# Patient Record
Sex: FEMALE | Race: BLACK OR AFRICAN AMERICAN | Employment: UNEMPLOYED | URBAN - METROPOLITAN AREA
[De-identification: names, ages, dates, MRNs, and addresses within clinical notes are randomized per-mention and may not be internally consistent; named-entity substitution may affect disease eponyms.]

---

## 2021-05-12 ENCOUNTER — OFFICE VISIT (OUTPATIENT)
Dept: ONCOLOGY | Age: 61
End: 2021-05-12
Payer: COMMERCIAL

## 2021-05-12 VITALS
HEIGHT: 66 IN | WEIGHT: 277 LBS | DIASTOLIC BLOOD PRESSURE: 92 MMHG | BODY MASS INDEX: 44.52 KG/M2 | HEART RATE: 76 BPM | RESPIRATION RATE: 16 BRPM | SYSTOLIC BLOOD PRESSURE: 186 MMHG | TEMPERATURE: 98.3 F | OXYGEN SATURATION: 96 %

## 2021-05-12 DIAGNOSIS — D75.1 ERYTHROCYTOSIS: Primary | ICD-10-CM

## 2021-05-12 PROCEDURE — 99204 OFFICE O/P NEW MOD 45 MIN: CPT | Performed by: STUDENT IN AN ORGANIZED HEALTH CARE EDUCATION/TRAINING PROGRAM

## 2021-05-12 RX ORDER — HYDROCHLOROTHIAZIDE 12.5 MG/1
TABLET ORAL
COMMUNITY
Start: 2021-05-04

## 2021-05-12 RX ORDER — LANCETS 33 GAUGE
EACH MISCELLANEOUS
COMMUNITY
Start: 2021-03-01

## 2021-05-12 RX ORDER — CETIRIZINE HCL 10 MG
TABLET ORAL
COMMUNITY
Start: 2021-05-06

## 2021-05-12 RX ORDER — HUMAN INSULIN 100 [IU]/ML
INJECTION, SUSPENSION SUBCUTANEOUS
COMMUNITY
Start: 2021-05-06

## 2021-05-12 RX ORDER — ASPIRIN 81 MG/1
TABLET ORAL
COMMUNITY
Start: 2021-05-04

## 2021-05-12 RX ORDER — CELECOXIB 200 MG/1
CAPSULE ORAL
COMMUNITY
Start: 2021-05-06

## 2021-05-12 RX ORDER — GABAPENTIN 400 MG/1
400 CAPSULE ORAL 3 TIMES DAILY
COMMUNITY

## 2021-05-12 RX ORDER — ATORVASTATIN CALCIUM 20 MG/1
TABLET, FILM COATED ORAL
COMMUNITY
Start: 2021-05-04

## 2021-05-12 RX ORDER — EMPAGLIFLOZIN 25 MG/1
TABLET, FILM COATED ORAL
COMMUNITY
Start: 2021-05-04

## 2021-05-12 RX ORDER — AMLODIPINE BESYLATE 5 MG/1
TABLET ORAL
COMMUNITY
Start: 2021-05-04

## 2021-05-12 RX ORDER — METOPROLOL TARTRATE 100 MG/1
TABLET ORAL
COMMUNITY
Start: 2021-05-04

## 2021-05-12 RX ORDER — BLOOD SUGAR DIAGNOSTIC
STRIP MISCELLANEOUS
COMMUNITY
Start: 2021-04-07

## 2021-05-12 RX ORDER — LANCETS 30 GAUGE
EACH MISCELLANEOUS
COMMUNITY
Start: 2021-05-04

## 2021-05-12 RX ORDER — CALCIUM CARB/VITAMIN D3/VIT K1 500-100-40
TABLET,CHEWABLE ORAL
COMMUNITY
Start: 2021-05-04

## 2021-05-12 RX ORDER — METFORMIN HYDROCHLORIDE 1000 MG/1
TABLET ORAL
COMMUNITY
Start: 2021-05-04

## 2021-05-12 NOTE — PROGRESS NOTES
[facility] [place] at [address] [facility] [address] [place] [address] [address] [facility]  W: [phone] F: [phone]      Reason for Visit:   Ranulfo Werner is a 56 y.o. female who is seen in consultation at the request of Kirk Philippe NP for evaluation of erythrocytosis, RBC abnormality . Hematology / Oncology Treatment History:     Hematological/Oncological Diagnosis: RBC abnormality, Erythrocytosis    History of Present Illness:       Very pleasant 56-year-old history was significant for diabetes mellitus type 2, hypertension, anxiety disorder, asthma, osteoarthritis, presents for evaluation and treatment of neuropathy as well as elevated RBC count. Patient reports that she has no significant clinical symptoms lately considering patient has been seen. She denies any unintentional weight loss, severe fatigue, night sweats, or any bone pains. She does note bilateral lower extremity neuropathy that has been bothering her significantly and is in a stocking glove pattern on her lower extremities. On further discussion, the patient reports that about 40 years ago, she was told that she had a hemoglobin variant that affected her blood. She was told that this did not have any associated clinical findings and she is unclear what the initial diagnosis was. No other known family history of blood disease or blood disorder. On review of social history, the patient is a current smoker smoking about 1.25 to 1.5 packs/day. She does note that this is recently increased over the last year as a consequence of anxiety. She has had no recent history of any colonoscopies and is overdue for her routine mammography. Positive ROS findings include: Anxiety, lower extremity neuropathy pain, mild fatigue  Review of Systems: A complete review of systems was obtained, negative except as described above.     Past Medical History:   Diagnosis Date    Diabetes ([facility] [address]     Hypertension       Past Surgical History:   Procedure Laterality Date    HX GYN          HX HEENT  2009    craniotomy and another procedure    HX ORTHOPAEDIC Bilateral 2011    carpal tunnel release surgery      Social History     Tobacco Use    Smoking status: Current Every Day Smoker     Packs/day: 1.25     Years: 40.00     Pack years: 50.00     Types: Cigarettes    Smokeless tobacco: Never Used   Substance Use Topics    Alcohol use: Yes     Frequency: Monthly or less     Comment: VERY RARE/ HOLDAYS ONLY      No family history on file. Current Outpatient Medications   Medication Sig    amLODIPine (NORVASC) 5 mg tablet TAKE ONE TABLET BY MOUTH EVERY DAY    aspirin delayed-release 81 mg tablet TAKE ONE TABLET BY MOUTH EVERY DAY    atorvastatin (LIPITOR) 20 mg tablet TAKE ONE TABLET BY MOUTH EVERY DAY    OneTouch Verio test strips strip USE TO CHECK BLOOD GLUCOSE TWO TIMES A DAY AND AS NEEDED    celecoxib (CELEBREX) 200 mg capsule TAKE ONE CAPSULE BY MOUTH TWICE A DAY    cetirizine (ZYRTEC) 10 mg tablet TAKE ONE TABLET BY MOUTH EVERY DAY    Jardiance 25 mg tablet TAKE ONE TABLET BY MOUTH EVERY DAY IN THE MORNING    hydroCHLOROthiazide (HYDRODIURIL) 12.5 mg tablet TAKE ONE TABLET BY MOUTH EVERY DAY    OneTouch Delica Plus Lancet 30 gauge misc USE TO CHECK BLOOD GLUCOSE TWO TIMES A DAY AND AS NEEDED    OneTouch Delica Plus Lancet 33 gauge misc USE TO CHECK BLOOD GLUCOSE TWO TIMES A DAY AND AS NEEDED    NovoLIN 70/30 U-100 Insulin 100 unit/mL (70-30) injection INJECT 63 UNITS UNDER THE SKIN TWO TIMES A DAY    metFORMIN (GLUCOPHAGE) 1,000 mg tablet TAKE ONE TABLET BY MOUTH TWICE A DAY    metoprolol tartrate (LOPRESSOR) 100 mg IR tablet TAKE ONE TABLET BY MOUTH TWICE A DAY    Insulin Syringe-Needle U-100 1 mL 31 gauge x 5/16 syrg USE TWO TIMES A DAY AS DIRECTED    gabapentin (NEURONTIN) 400 mg capsule Take 400 mg by mouth three (3) times daily.      No current facility-administered medications for this visit. No Known Allergies         Physical Exam:     Visit Vitals  BP (!) 186/92 (BP 1 Location: Right arm, BP Patient Position: At rest)   Pulse 76   Temp 98.3 °F (36.8 °C) (Temporal)   Resp 16   Ht 5' 6\" (1.676 m)   Wt 277 lb (125.6 kg)   SpO2 96% Comment: RA   BMI 44.71 kg/m²     ECOG PS: 1  General: No distress  Eyes: PERRLA, anicteric sclerae, conjunctiva are injected  HENT: Atraumatic with normal appearance of ears and nose; OP clear  Neck: Supple; no visualized JVD  Lymphatic: No cervical, or supraclavicular lymphadenopathy. Respiratory: CTAB, normal respiratory effort  CV: Normal rate, regular rhythm, no murmurs, no peripheral edema  GI: Soft, nontender, nondistended, no palpable masses, no hepatomegaly, no splenomegaly. Area of skin thickening under right breast with some hardening of subcutaneous fat just under right breast.   MS: Normal gait and station. Digits without clubbing or cyanosis. Skin: No rashes, ecchymoses, or petechiae. Normal temperature, turgor, and texture. Neuro/Psych: Alert, oriented, appropriate affect, normal judgment/insight      Results:     Lab Results   Component Value Date/Time    WBC 19.7 (H) 04/17/2009 04:00 AM    HGB 14.2 04/17/2009 04:00 AM    HCT 38.9 04/17/2009 04:00 AM    PLATELET 715 65/52/7658 04:00 AM    MCV 84.4 04/17/2009 04:00 AM    ABS.  NEUTROPHILS 21.4 (H) 04/16/2009 03:00 AM     Lab Results   Component Value Date/Time    Sodium 145 04/17/2009 04:00 AM    Potassium 3.3 (L) 04/17/2009 04:00 AM    Chloride 111 (H) 04/17/2009 04:00 AM    CO2 21 04/17/2009 04:00 AM    Glucose 302 (H) 04/17/2009 04:00 AM    BUN 27 (H) 04/17/2009 04:00 AM    Creatinine 2.1 (H) 04/17/2009 04:00 AM    GFR est AA 32 (L) 04/17/2009 04:00 AM    GFR est non-AA 26 (L) 04/17/2009 04:00 AM    Calcium 9.8 04/17/2009 04:00 AM    Glucose (POC) 341 (H) 04/17/2009 02:28 PM     Lab Results   Component Value Date/Time    Bilirubin, total 2.5 (H) 04/16/2009 03:00 AM    ALT (SGPT) 57 04/16/2009 03:00 AM    Alk. phosphatase 192 (H) 04/16/2009 03:00 AM    Protein, total 8.3 (H) 04/16/2009 03:00 AM    Albumin 3.2 (L) 04/16/2009 03:00 AM    Globulin 5.1 (H) 04/16/2009 03:00 AM         Assessment and Recommendations:     1. 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 The differential diagnosis is quite broad. Relative erythrocytosis may be seen with loss of fluid from the vascular spaces, as may be seen with emesis, diarrhea, diuretics, sweating, polyuria, hypodipsia, hypoalbuminemia, capillary leak syndrome, and burns. Relative erythrocytosis may also be seen with chronic plasma volume contraction with may be seen with hypoxia, androgen therapy, recombinant erythropoietin therapy, hypertension, tobacco use, pheochromocytoma, ethanol abuse and sleep apnea. Absolute erythrocytosis may be seen with hypoxia as well, such as many be seen with carbon monoxide intoxication (both from cigarettes or environmental), high affinity hemoglobins, high altitude, pulmonary disease, right to left shunts, sleep apnea and neurologic diseases. Absolute erythrocytosis is also seen with renal diseases, such as renal artery stenosis, focal sclerosing or membranous glomerulonephritis, and renal transplants. Tumors that are associated with true erythrocytosis include hypernephroma, hepatoma, cerebellar hemangioblasoma, uterine fibromyoma, adrenal tumors, meningioma and pheochromocytoma. Drugs associated with erythrocytosis include androgenic steroids and recombinant erythropoietin. Familial erythrocytosis is seen with [place] polycythemia (autosomal recessive associated with germline mutation of VHL), epo receptor mutations and 2,3 DPG deficiency. Erythrocytosis is also seen with polycythemia vera but is rarely isolated. If possible, the recommendation is to get an rbc mass and plasma volume determination.  If one has a normal rbc mass and decreased plasma volume, the likely differential narrows down to tobacco use, androgens, diuretics, hypertension and pheochromocytoma. If the patient has an elevated rbc mass, one should next check the O2 saturation. If it is less than 93%, consideration should be given to hypoxic erythrocytosis as described above. If the O2 saturation is 93% or higher, one should check for the Don 2 V617F mutation, which is positive, generally signifies polycythemia vera. If the Jak2 mutation is negative, one should next check a serum erythropoietin level. If the erythropoietin level is high, the differential diagnosis narrows down to renal disease, tumors (as described above), [place] polycythemia, and high affinity hemoglobins. If the erythropoietin level is normal or low, one considers polycythemia vera, sleep apnea, epo-receptor mutation, renal disease, tumors, and high affinity hemoglobin. I have reviewed the basic rationale behind the workup with the patient who is agreeable to proceeding with initial testing. 2. Neuropathy  -Hematologic contributions neuropathy are broad though they do include plasma cell dyscrasias as well as contributions from autoimmune disease. We will plan to rule out plasma cell dyscrasia with SPEP/gammopathy profile.     Follow-up visit in 2 weeks to review labs    Signed By: Basilia James MD      Attending [address] [place] [address]

## 2021-05-27 ENCOUNTER — OFFICE VISIT (OUTPATIENT)
Dept: ONCOLOGY | Age: 61
End: 2021-05-27
Payer: COMMERCIAL

## 2021-05-27 VITALS
HEART RATE: 68 BPM | OXYGEN SATURATION: 95 % | TEMPERATURE: 98.6 F | DIASTOLIC BLOOD PRESSURE: 69 MMHG | RESPIRATION RATE: 18 BRPM | HEIGHT: 66 IN | SYSTOLIC BLOOD PRESSURE: 119 MMHG | BODY MASS INDEX: 43.71 KG/M2 | WEIGHT: 272 LBS

## 2021-05-27 DIAGNOSIS — D75.1 ERYTHROPOIETIN POLYCYTHEMIA: ICD-10-CM

## 2021-05-27 DIAGNOSIS — D75.839 THROMBOCYTOSIS: Primary | ICD-10-CM

## 2021-05-27 DIAGNOSIS — D75.1 ERYTHROCYTOSIS: ICD-10-CM

## 2021-05-27 PROCEDURE — 99213 OFFICE O/P EST LOW 20 MIN: CPT | Performed by: STUDENT IN AN ORGANIZED HEALTH CARE EDUCATION/TRAINING PROGRAM

## 2021-05-27 RX ORDER — ALBUTEROL SULFATE 90 UG/1
AEROSOL, METERED RESPIRATORY (INHALATION)
COMMUNITY
Start: 2021-05-04

## 2021-05-27 NOTE — PROGRESS NOTES
Kvng Meneses is a 56 y.o. female  HIPAA verified by two patient identifiers. Chief Complaint   Patient presents with    Follow-up     Erythrocytosis, here for lab results     Visit Vitals  /69   Pulse 68   Temp 98.6 °F (37 °C) (Oral)   Resp 18   Ht 5' 6\" (1.676 m)   Wt 272 lb (123.4 kg)   SpO2 95%   BMI 43.90 kg/m²       Pain Scale: 9/10  Pain Location: Generalized  1. Have you been to the ER, urgent care clinic since your last visit? Hospitalized since your last visit? No    2. Have you seen or consulted any other health care providers outside of the [address] [place] [address] since your last visit? Include any pap smears or colon screening.  No

## 2021-05-28 NOTE — PROGRESS NOTES
[facility] [place] at [address] [facility] [address]  W: [phone] F: [phone]      Reason for Visit:   Oliva Londono is a 56 y.o. female who is seen in consultation at the request of Lucila Fan NP for evaluation of erythrocytosis, RBC abnormality . Hematology / Oncology Treatment History:     Hematological/Oncological Diagnosis: RBC abnormality, Erythrocytosis    History of Present Illness:       Very pleasant 56-year-old history was significant for diabetes mellitus type 2, hypertension, anxiety disorder, asthma, osteoarthritis, presents for evaluation and treatment of neuropathy as well as elevated RBC count. Patient reports that she has no significant clinical symptoms lately considering patient has been seen. She denies any unintentional weight loss, severe fatigue, night sweats, or any bone pains. She does note bilateral lower extremity neuropathy that has been bothering her significantly and is in a stocking glove pattern on her lower extremities. On further discussion, the patient reports that about 40 years ago, she was told that she had a hemoglobin variant that affected her blood. She was told that this did not have any associated clinical findings and she is unclear what the initial diagnosis was. No other known family history of blood disease or blood disorder. On review of social history, the patient is a current smoker smoking about 1.25 to 1.5 packs/day. She does note that this is recently increased over the last year as a consequence of anxiety. She has had no recent history of any colonoscopies and is overdue for her routine mammography. Interval History:     Since last evaluation, the patient has been without new severe complaints. No reported vasomotor symptoms. She continues to have mild fatigue, anxiety and lower extremity pain    Positive ROS findings include:  Anxiety, lower extremity neuropathy pain, mild fatigue  Review of Systems: A complete review of systems was obtained, negative except as described above. Past Medical History:   Diagnosis Date    Diabetes ([address]     Hypertension       Past Surgical History:   Procedure Laterality Date    HX GYN          HX HEENT  2009    craniotomy and another procedure    HX ORTHOPAEDIC Bilateral 2011    carpal tunnel release surgery      Social History     Tobacco Use    Smoking status: Current Every Day Smoker     Packs/day: 1.25     Years: 40.00     Pack years: 50.00     Types: Cigarettes    Smokeless tobacco: Never Used   Substance Use Topics    Alcohol use: Yes     Comment: VERY RARE/ HOLDAYS ONLY      History reviewed. No pertinent family history.   Current Outpatient Medications   Medication Sig    amLODIPine (NORVASC) 5 mg tablet TAKE ONE TABLET BY MOUTH EVERY DAY    aspirin delayed-release 81 mg tablet TAKE ONE TABLET BY MOUTH EVERY DAY    atorvastatin (LIPITOR) 20 mg tablet TAKE ONE TABLET BY MOUTH EVERY DAY    OneTouch Verio test strips strip USE TO CHECK BLOOD GLUCOSE TWO TIMES A DAY AND AS NEEDED    celecoxib (CELEBREX) 200 mg capsule TAKE ONE CAPSULE BY MOUTH TWICE A DAY    cetirizine (ZYRTEC) 10 mg tablet TAKE ONE TABLET BY MOUTH EVERY DAY    Jardiance 25 mg tablet TAKE ONE TABLET BY MOUTH EVERY DAY IN THE MORNING    hydroCHLOROthiazide (HYDRODIURIL) 12.5 mg tablet TAKE ONE TABLET BY MOUTH EVERY DAY    OneTouch Delica Plus Lancet 30 gauge misc USE TO CHECK BLOOD GLUCOSE TWO TIMES A DAY AND AS NEEDED    OneTouch Delica Plus Lancet 33 gauge misc USE TO CHECK BLOOD GLUCOSE TWO TIMES A DAY AND AS NEEDED    NovoLIN 70/30 U-100 Insulin 100 unit/mL (70-30) injection INJECT 63 UNITS UNDER THE SKIN TWO TIMES A DAY    metFORMIN (GLUCOPHAGE) 1,000 mg tablet TAKE ONE TABLET BY MOUTH TWICE A DAY    metoprolol tartrate (LOPRESSOR) 100 mg IR tablet TAKE ONE TABLET BY MOUTH TWICE A DAY    Insulin Syringe-Needle U-100 1 mL 31 gauge x 5/16 syrg USE TWO TIMES A DAY AS DIRECTED    gabapentin (NEURONTIN) 400 mg capsule Take 400 mg by mouth three (3) times daily.  albuterol (PROVENTIL HFA, VENTOLIN HFA, PROAIR HFA) 90 mcg/actuation inhaler INHALE TWO PUFFS BY MOUTH EVERY 4 TO 6 HOURS AS NEEDED     No current facility-administered medications for this visit. No Known Allergies         Physical Exam:     Visit Vitals  /69   Pulse 68   Temp 98.6 °F (37 °C) (Oral)   Resp 18   Ht 5' 6\" (1.676 m)   Wt 272 lb (123.4 kg)   SpO2 95%   BMI 43.90 kg/m²     ECOG PS: 1  General: No distress  Eyes: PERRLA, anicteric sclerae, conjunctiva are no longer injected  HENT: Atraumatic with normal appearance of ears and nose; OP clear  Neck: Supple; no visualized JVD  Lymphatic: No cervical, or supraclavicular lymphadenopathy. Respiratory: CTAB, normal respiratory effort  CV: Normal rate, regular rhythm, no murmurs, no peripheral edema  GI: Soft, nontender, nondistended, no palpable masses, no hepatomegaly, no splenomegaly. MS: Normal gait and station. Digits without clubbing or cyanosis. Skin: No rashes, ecchymoses, or petechiae. Normal temperature, turgor, and texture. Neuro/Psych: Alert, oriented, appropriate affect, normal judgment/insight      Results:     Lab Results   Component Value Date/Time    WBC 9.0 05/12/2021 11:35 AM    HGB 16.4 (H) 05/12/2021 11:35 AM    HCT 47.1 (H) 05/12/2021 11:35 AM    PLATELET 593 45/02/2251 11:35 AM    MCV 89.7 05/12/2021 11:35 AM    ABS.  NEUTROPHILS 4.3 05/12/2021 11:35 AM     Lab Results   Component Value Date/Time    Sodium 139 05/12/2021 11:35 AM    Potassium 4.0 05/12/2021 11:35 AM    Chloride 107 05/12/2021 11:35 AM    CO2 25 05/12/2021 11:35 AM    Glucose 177 (H) 05/12/2021 11:35 AM    BUN 21 (H) 05/12/2021 11:35 AM    Creatinine 1.06 (H) 05/12/2021 11:35 AM    GFR est AA >60 05/12/2021 11:35 AM    GFR est non-AA 53 (L) 05/12/2021 11:35 AM    Calcium 9.4 05/12/2021 11:35 AM    Glucose (POC) 341 (H) 04/17/2009 02:28 PM     Lab Results   Component Value Date/Time    Bilirubin, total 1.3 (H) 05/12/2021 11:35 AM    ALT (SGPT) 113 (H) 05/12/2021 11:35 AM    Alk. phosphatase 107 05/12/2021 11:35 AM    Protein, total 7.0 05/12/2021 11:35 AM    Protein, total 6.8 05/12/2021 11:35 AM    Albumin 3.8 05/12/2021 11:35 AM    Globulin 3.2 05/12/2021 11:35 AM         Assessment and Recommendations:     1. Hemoglobin C trait  - Hemoglobin C trait is a hemoglobinopathy that is clinically silent. Patient educated bout HbC trait. No expected significant clinical pathology from this hemoglobin variant. 2. Innapropraitely elevated EPO level, elevated LDH  - Given the patient's erythrocytosis, would expect EPO level to be suppressed. It is not and is above the upper threshold of normal.    - Will check CT abd/pelvis with contrast to rule out renal mass, liver mass. 3. Neuropathy  -SPEP, DEIDRA negative. B12, folate normal.      Follow-up visit in 2 weeks to review imaging to rule out renal mass/liver lesion.     Signed By: Benny Tubbs MD      Attending Medical Oncologist   [facility]

## 2021-06-11 ENCOUNTER — HOSPITAL ENCOUNTER (OUTPATIENT)
Dept: CT IMAGING | Age: 61
Discharge: HOME | End: 2021-06-11
Attending: STUDENT IN AN ORGANIZED HEALTH CARE EDUCATION/TRAINING PROGRAM
Payer: COMMERCIAL

## 2021-06-11 DIAGNOSIS — D75.839 THROMBOCYTOSIS: ICD-10-CM

## 2021-06-11 DIAGNOSIS — D75.1 ERYTHROPOIETIN POLYCYTHEMIA: ICD-10-CM

## 2021-06-11 PROCEDURE — 74177 CT ABD & PELVIS W/CONTRAST: CPT

## 2021-06-11 PROCEDURE — 74011000636 HC RX REV CODE- 636: Performed by: STUDENT IN AN ORGANIZED HEALTH CARE EDUCATION/TRAINING PROGRAM

## 2021-06-11 PROCEDURE — 74011000250 HC RX REV CODE- 250: Performed by: STUDENT IN AN ORGANIZED HEALTH CARE EDUCATION/TRAINING PROGRAM

## 2021-06-11 RX ORDER — BARIUM SULFATE 20 MG/ML
900 SUSPENSION ORAL
Status: COMPLETED | OUTPATIENT
Start: 2021-06-11 | End: 2021-06-11

## 2021-06-11 RX ADMIN — IOPAMIDOL 100 ML: 755 INJECTION, SOLUTION INTRAVENOUS at 13:23

## 2021-06-11 RX ADMIN — BARIUM SULFATE 900 ML: 20 SUSPENSION ORAL at 13:24

## 2021-06-17 ENCOUNTER — OFFICE VISIT (OUTPATIENT)
Dept: ONCOLOGY | Age: 61
End: 2021-06-17
Payer: COMMERCIAL

## 2021-06-17 VITALS
SYSTOLIC BLOOD PRESSURE: 162 MMHG | BODY MASS INDEX: 45.48 KG/M2 | TEMPERATURE: 98.5 F | DIASTOLIC BLOOD PRESSURE: 85 MMHG | RESPIRATION RATE: 20 BRPM | WEIGHT: 283 LBS | HEART RATE: 73 BPM | OXYGEN SATURATION: 95 % | HEIGHT: 66 IN

## 2021-06-17 DIAGNOSIS — D75.1 ERYTHROCYTOSIS: Primary | ICD-10-CM

## 2021-06-17 DIAGNOSIS — D75.1 ERYTHROPOIETIN POLYCYTHEMIA: ICD-10-CM

## 2021-06-17 PROCEDURE — 99214 OFFICE O/P EST MOD 30 MIN: CPT | Performed by: STUDENT IN AN ORGANIZED HEALTH CARE EDUCATION/TRAINING PROGRAM

## 2021-06-17 NOTE — PROGRESS NOTES
[facility] [place] at [address] [facility] [address]  W: [phone] F: [phone]      Reason for Visit:   Lyn Kussmaul is a 56 y.o. female who is seen in consultation at the request of Endy Arreguin NP for evaluation of erythrocytosis, RBC abnormality . Hematology / Oncology Treatment History:     Hematological/Oncological Diagnosis: RBC abnormality, Erythrocytosis    History of Present Illness:       Very pleasant 78-year-old history was significant for diabetes mellitus type 2, hypertension, anxiety disorder, asthma, osteoarthritis, presents for evaluation and treatment of neuropathy as well as elevated RBC count. Patient reports that she has no significant clinical symptoms lately considering patient has been seen. She denies any unintentional weight loss, severe fatigue, night sweats, or any bone pains. She does note bilateral lower extremity neuropathy that has been bothering her significantly and is in a stocking glove pattern on her lower extremities. On further discussion, the patient reports that about 40 years ago, she was told that she had a hemoglobin variant that affected her blood. She was told that this did not have any associated clinical findings and she is unclear what the initial diagnosis was. No other known family history of blood disease or blood disorder. On review of social history, the patient is a current smoker smoking about 1.25 to 1.5 packs/day. She does note that this is recently increased over the last year as a consequence of anxiety. She has had no recent history of any colonoscopies and is overdue for her routine mammography. Interval History:     No new clinical complaints since last visit, continues to have lower extremity neuropathy, fatigue. No clear vasomotor symptoms. Positive ROS findings include:  Anxiety, lower extremity neuropathy pain, mild fatigue  Review of Systems: A complete review of systems was obtained, negative except as described above. Past Medical History:   Diagnosis Date    Diabetes ([address]     Hypertension       Past Surgical History:   Procedure Laterality Date    HX GYN          HX HEENT  2009    craniotomy and another procedure    HX ORTHOPAEDIC Bilateral 2011    carpal tunnel release surgery      Social History     Tobacco Use    Smoking status: Current Every Day Smoker     Packs/day: 1.25     Years: 40.00     Pack years: 50.00     Types: Cigarettes    Smokeless tobacco: Never Used   Substance Use Topics    Alcohol use: Yes     Comment: VERY RARE/ HOLDAYS ONLY      History reviewed. No pertinent family history.   Current Outpatient Medications   Medication Sig    albuterol (PROVENTIL HFA, VENTOLIN HFA, PROAIR HFA) 90 mcg/actuation inhaler INHALE TWO PUFFS BY MOUTH EVERY 4 TO 6 HOURS AS NEEDED    amLODIPine (NORVASC) 5 mg tablet TAKE ONE TABLET BY MOUTH EVERY DAY    aspirin delayed-release 81 mg tablet TAKE ONE TABLET BY MOUTH EVERY DAY    atorvastatin (LIPITOR) 20 mg tablet TAKE ONE TABLET BY MOUTH EVERY DAY    OneTouch Verio test strips strip USE TO CHECK BLOOD GLUCOSE TWO TIMES A DAY AND AS NEEDED    celecoxib (CELEBREX) 200 mg capsule TAKE ONE CAPSULE BY MOUTH TWICE A DAY    cetirizine (ZYRTEC) 10 mg tablet TAKE ONE TABLET BY MOUTH EVERY DAY    Jardiance 25 mg tablet TAKE ONE TABLET BY MOUTH EVERY DAY IN THE MORNING    hydroCHLOROthiazide (HYDRODIURIL) 12.5 mg tablet TAKE ONE TABLET BY MOUTH EVERY DAY    OneTouch Delica Plus Lancet 30 gauge misc USE TO CHECK BLOOD GLUCOSE TWO TIMES A DAY AND AS NEEDED    OneTouch Delica Plus Lancet 33 gauge misc USE TO CHECK BLOOD GLUCOSE TWO TIMES A DAY AND AS NEEDED    NovoLIN 70/30 U-100 Insulin 100 unit/mL (70-30) injection INJECT 63 UNITS UNDER THE SKIN TWO TIMES A DAY    metFORMIN (GLUCOPHAGE) 1,000 mg tablet TAKE ONE TABLET BY MOUTH TWICE A DAY    metoprolol tartrate (LOPRESSOR) 100 mg IR tablet TAKE ONE TABLET BY MOUTH TWICE A DAY    Insulin Syringe-Needle U-100 1 mL 31 gauge x 5/16 syrg USE TWO TIMES A DAY AS DIRECTED    gabapentin (NEURONTIN) 400 mg capsule Take 400 mg by mouth three (3) times daily. No current facility-administered medications for this visit. No Known Allergies         Physical Exam:     Visit Vitals  BP (!) 162/85   Pulse 73   Temp 98.5 °F (36.9 °C) (Oral)   Resp 20   Ht 5' 6\" (1.676 m)   Wt 283 lb (128.4 kg)   SpO2 95%   BMI 45.68 kg/m²     ECOG PS: 1  General: No distress  Eyes: PERRLA, anicteric sclerae, conjunctiva are no longer injected  HENT: Atraumatic with normal appearance of ears and nose; OP clear  Neck: Supple; no visualized JVD  Lymphatic: No cervical, or supraclavicular lymphadenopathy. Respiratory: CTAB, normal respiratory effort  CV: Normal rate, regular rhythm, no murmurs, no peripheral edema  GI: Soft, nontender, nondistended, no palpable masses, no hepatomegaly, no splenomegaly. MS: Normal gait and station. Digits without clubbing or cyanosis. Skin: No rashes, ecchymoses, or petechiae. Normal temperature, turgor, and texture. Neuro/Psych: Alert, oriented, appropriate affect, normal judgment/insight      Results:     Lab Results   Component Value Date/Time    WBC 9.0 05/12/2021 11:35 AM    HGB 16.4 (H) 05/12/2021 11:35 AM    HCT 47.1 (H) 05/12/2021 11:35 AM    PLATELET 847 02/84/7313 11:35 AM    MCV 89.7 05/12/2021 11:35 AM    ABS.  NEUTROPHILS 4.3 05/12/2021 11:35 AM     Lab Results   Component Value Date/Time    Sodium 139 05/12/2021 11:35 AM    Potassium 4.0 05/12/2021 11:35 AM    Chloride 107 05/12/2021 11:35 AM    CO2 25 05/12/2021 11:35 AM    Glucose 177 (H) 05/12/2021 11:35 AM    BUN 21 (H) 05/12/2021 11:35 AM    Creatinine 1.06 (H) 05/12/2021 11:35 AM    GFR est AA >60 05/12/2021 11:35 AM    GFR est non-AA 53 (L) 05/12/2021 11:35 AM    Calcium 9.4 05/12/2021 11:35 AM    Glucose (POC) 341 (H) 04/17/2009 02:28 PM     Lab Results   Component Value Date/Time    Bilirubin, total 1.3 (H) 05/12/2021 11:35 AM    ALT (SGPT) 113 (H) 05/12/2021 11:35 AM    Alk. phosphatase 107 05/12/2021 11:35 AM    Protein, total 7.0 05/12/2021 11:35 AM    Protein, total 6.8 05/12/2021 11:35 AM    Albumin 3.8 05/12/2021 11:35 AM    Globulin 3.2 05/12/2021 11:35 AM         Assessment and Recommendations:     # Polycythemia with elevated EPO level   - will recheck EPO level today, CT abd/pelvis shows no evidence of tumor or epo secreting mass  - other causes of elevated EPO include hypoxemia, including from MAY, IP shunts, or chronic carbon monoxide poisoning from tobacco use. - Patient educated on importance of smoking cessation. She is agreeable to try and reduce burned tobacco use. - The patient should have workup for MAY given obesity and history of poor sleep.    - repeat CBC today as well. - Will check JAK2  # Hemoglobin C trait  - Hemoglobin C trait is a hemoglobinopathy that is clinically silent. Patient educated bout HbC trait. No expected significant clinical pathology from this hemoglobin variant.      Follow up in 4 months for trending of polycythemia    Signed By: Lynnette Fraire MD      Attending [address] [place] [address]

## 2021-06-17 NOTE — PROGRESS NOTES
Essence Maxwell is a 56 y.o. female  HIPAA verified by two patient identifiers. Health Maintenance Due   Topic    Hepatitis C Screening     Pneumococcal 0-64 years (1 of 2 - PPSV23)    Lipid Screen     DTaP/Tdap/Td series (1 - Tdap)    PAP AKA CERVICAL CYTOLOGY     Shingrix Vaccine Age 49> (1 of 2)    Colorectal Cancer Screening Combo     Breast Cancer Screen Mammogram      Chief Complaint   Patient presents with    Follow-up     tEST RESULTS REVIEW     Visit Vitals  BP (!) 162/85   Pulse 73   Temp 98.5 °F (36.9 °C) (Oral)   Resp 20   Ht 5' 6\" (1.676 m)   Wt 283 lb (128.4 kg)   SpO2 95%   BMI 45.68 kg/m²       Pain Scale: 10 - Worst pain ever/10  Pain Location: Foot (BILATERAL FEET AND LEGS NEUROPOTHY)  1. Have you been to the ER, urgent care clinic since your last visit? Hospitalized since your last visit? No    2. Have you seen or consulted any other health care providers outside of the [address] [place] [address] since your last visit? Include any pap smears or colon screening.  No

## 2021-10-08 ENCOUNTER — TELEPHONE (OUTPATIENT)
Dept: ONCOLOGY | Age: 61
End: 2021-10-08

## 2021-10-08 NOTE — TELEPHONE ENCOUNTER
Call placed to pt. No answer. Nurse left voicemail message for pt to return nurse call.      Pt needs to have her labs drawn prior to her appt on 10/11

## 2021-10-08 NOTE — TELEPHONE ENCOUNTER
Return call placed to pt. HIPAA verified by two patient identifiers. Pt informed she needs a CBC w/ Diff. Pt stated she's having labs drawn next week by her PCP. Pt asking PCP if they're able to draw a CBC along w/ the other labs she's getting drawn. Pt transferred to [facility] to reschedule her appt.

## 2021-10-14 ENCOUNTER — OFFICE VISIT (OUTPATIENT)
Dept: ONCOLOGY | Age: 61
End: 2021-10-14
Payer: COMMERCIAL

## 2021-10-14 VITALS
SYSTOLIC BLOOD PRESSURE: 149 MMHG | RESPIRATION RATE: 18 BRPM | HEIGHT: 66 IN | OXYGEN SATURATION: 96 % | WEIGHT: 273.8 LBS | HEART RATE: 72 BPM | DIASTOLIC BLOOD PRESSURE: 80 MMHG | TEMPERATURE: 98.2 F | BODY MASS INDEX: 44 KG/M2

## 2021-10-14 DIAGNOSIS — D58.2 HEMOGLOBIN C TRAIT (HCC): ICD-10-CM

## 2021-10-14 DIAGNOSIS — D75.1 ERYTHROCYTOSIS: ICD-10-CM

## 2021-10-14 DIAGNOSIS — D75.1 ERYTHROPOIETIN POLYCYTHEMIA: Primary | ICD-10-CM

## 2021-10-14 PROCEDURE — 99214 OFFICE O/P EST MOD 30 MIN: CPT | Performed by: STUDENT IN AN ORGANIZED HEALTH CARE EDUCATION/TRAINING PROGRAM

## 2021-10-14 RX ORDER — PREGABALIN 75 MG/1
75 CAPSULE ORAL 2 TIMES DAILY
COMMUNITY

## 2021-10-14 NOTE — PROGRESS NOTES
Shar Venegas is a 59 y.o. female here today for Erythrocytosis & Polycythemia f/u. Pt had he labs drawn yesterday at her PCP's office. Chief Complaint   Patient presents with    Follow-up     Polycythemia & Erythrocytosis     1. Have you been to the ER, urgent care clinic since your last visit? Hospitalized since your last visit? No    2. Have you seen or consulted any other health care providers outside of the [address] [place] [address] since your last visit? Include any pap smears or colon screening.  No

## 2021-10-14 NOTE — PROGRESS NOTES
[facility] [place] at [address] [facility]  W: [phone] F: [phone]      Reason for Visit:   Kelvin Sarkar is a 59 y.o. female who is seen in consultation at the request of Delbert Gupta NP for evaluation of erythrocytosis, RBC abnormality . Hematology / Oncology Treatment History:     Hematological/Oncological Diagnosis: RBC abnormality, Erythrocytosis    History of Present Illness:       Very pleasant 73-year-old history was significant for diabetes mellitus type 2, hypertension, anxiety disorder, asthma, osteoarthritis, presents for evaluation and treatment of neuropathy as well as elevated RBC count. Patient reports that she has no significant clinical symptoms lately considering patient has been seen. She denies any unintentional weight loss, severe fatigue, night sweats, or any bone pains. She does note bilateral lower extremity neuropathy that has been bothering her significantly and is in a stocking glove pattern on her lower extremities. On further discussion, the patient reports that about 40 years ago, she was told that she had a hemoglobin variant that affected her blood. She was told that this did not have any associated clinical findings and she is unclear what the initial diagnosis was. No other known family history of blood disease or blood disorder. On review of social history, the patient is a current smoker smoking about 1.25 to 1.5 packs/day. She does note that this is recently increased over the last year as a consequence of anxiety. She has had no recent history of any colonoscopies and is overdue for her routine mammography. Interval History:     No new clinical complaints since last visit, continues to have lower extremity neuropathy, fatigue. No clear vasomotor symptoms. Positive ROS findings include:  Anxiety, lower extremity neuropathy pain, mild fatigue  Review of Systems: A complete review of systems was obtained, negative except as described above. Past Medical History:   Diagnosis Date    Diabetes ([address]     Hypertension       Past Surgical History:   Procedure Laterality Date    HX GYN          HX HEENT  2009    craniotomy and another procedure    HX ORTHOPAEDIC Bilateral 2011    carpal tunnel release surgery      Social History     Tobacco Use    Smoking status: Current Every Day Smoker     Packs/day: 1.25     Years: 40.00     Pack years: 50.00     Types: Cigarettes    Smokeless tobacco: Never Used   Substance Use Topics    Alcohol use: Yes     Comment: VERY RARE/ HOLDAYS ONLY      No family history on file. Current Outpatient Medications   Medication Sig    pregabalin (Lyrica) 75 mg capsule Take 75 mg by mouth two (2) times a day.  Indications: diabetic complication causing injury to some body nerves    albuterol (PROVENTIL HFA, VENTOLIN HFA, PROAIR HFA) 90 mcg/actuation inhaler INHALE TWO PUFFS BY MOUTH EVERY 4 TO 6 HOURS AS NEEDED    amLODIPine (NORVASC) 5 mg tablet TAKE ONE TABLET BY MOUTH EVERY DAY    aspirin delayed-release 81 mg tablet TAKE ONE TABLET BY MOUTH EVERY DAY    atorvastatin (LIPITOR) 20 mg tablet TAKE ONE TABLET BY MOUTH EVERY DAY    OneTouch Verio test strips strip USE TO CHECK BLOOD GLUCOSE TWO TIMES A DAY AND AS NEEDED    celecoxib (CELEBREX) 200 mg capsule TAKE ONE CAPSULE BY MOUTH TWICE A DAY    cetirizine (ZYRTEC) 10 mg tablet TAKE ONE TABLET BY MOUTH EVERY DAY    Jardiance 25 mg tablet TAKE ONE TABLET BY MOUTH EVERY DAY IN THE MORNING    hydroCHLOROthiazide (HYDRODIURIL) 12.5 mg tablet TAKE ONE TABLET BY MOUTH EVERY DAY    OneTouch Delica Plus Lancet 30 gauge misc USE TO CHECK BLOOD GLUCOSE TWO TIMES A DAY AND AS NEEDED    OneTouch Delica Plus Lancet 33 gauge misc USE TO CHECK BLOOD GLUCOSE TWO TIMES A DAY AND AS NEEDED    NovoLIN 70/30 U-100 Insulin 100 unit/mL (70-30) injection INJECT 63 UNITS UNDER THE SKIN TWO TIMES A DAY    metFORMIN (GLUCOPHAGE) 1,000 mg tablet TAKE ONE TABLET BY MOUTH TWICE A DAY    metoprolol tartrate (LOPRESSOR) 100 mg IR tablet TAKE ONE TABLET BY MOUTH TWICE A DAY    Insulin Syringe-Needle U-100 1 mL 31 gauge x 5/16 syrg USE TWO TIMES A DAY AS DIRECTED    gabapentin (NEURONTIN) 400 mg capsule Take 400 mg by mouth three (3) times daily. (Patient not taking: Reported on 10/14/2021)     No current facility-administered medications for this visit. No Known Allergies         Physical Exam:     Visit Vitals  BP (!) 149/80   Pulse 72   Temp 98.2 °F (36.8 °C)   Resp 18   Ht 5' 6\" (1.676 m)   Wt 273 lb 12.8 oz (124.2 kg)   SpO2 96%   BMI 44.19 kg/m²     ECOG PS: 1  General: No distress  Eyes: PERRLA, anicteric sclerae, conjunctiva are no longer injected  HENT: Atraumatic with normal appearance of ears and nose; OP clear  Neck: Supple; no visualized JVD  Lymphatic: No cervical, or supraclavicular lymphadenopathy. Respiratory: CTAB, normal respiratory effort  CV: Normal rate, regular rhythm, no murmurs, no peripheral edema  GI: Soft, nontender, nondistended, no palpable masses, no hepatomegaly, no splenomegaly. MS: Normal gait and station. Digits without clubbing or cyanosis. Skin: No rashes, ecchymoses, or petechiae. Normal temperature, turgor, and texture. Neuro/Psych: Alert, oriented, appropriate affect, normal judgment/insight      Results:     Lab Results   Component Value Date/Time    WBC 9.3 06/17/2021 02:30 PM    HGB 16.5 (H) 06/17/2021 02:30 PM    HCT 47.6 (H) 06/17/2021 02:30 PM    PLATELET 973 57/91/2723 02:30 PM    MCV 90.3 06/17/2021 02:30 PM    ABS.  NEUTROPHILS 4.6 06/17/2021 02:30 PM     Lab Results   Component Value Date/Time    Sodium 138 06/17/2021 02:30 PM    Potassium 4.1 06/17/2021 02:30 PM    Chloride 106 06/17/2021 02:30 PM    CO2 24 06/17/2021 02:30 PM    Glucose 189 (H) 06/17/2021 02:30 PM    BUN 20 06/17/2021 02:30 PM    Creatinine 1.15 (H) 06/17/2021 02:30 PM    GFR est AA 58 (L) 06/17/2021 02:30 PM    GFR est non-AA 48 (L) 06/17/2021 02:30 PM    Calcium 10.1 06/17/2021 02:30 PM    Glucose (POC) 341 (H) 04/17/2009 02:28 PM     Lab Results   Component Value Date/Time    Bilirubin, total 1.5 (H) 06/17/2021 02:30 PM    ALT (SGPT) 82 (H) 06/17/2021 02:30 PM    Alk. phosphatase 111 06/17/2021 02:30 PM    Protein, total 7.4 06/17/2021 02:30 PM    Albumin 3.9 06/17/2021 02:30 PM    Globulin 3.5 06/17/2021 02:30 PM         Assessment and Recommendations:     # Polycythemia with elevated EPO level   -JAK2 negative, patient continues to have symptoms of slight fatigue.  -We will send patient for sleep study with pulmonary/sleep medicine referral  -Polycythemia appears to be stable. No big clinical changes  - Etiology of polycythemia may also be secondary to-continued smoking  # Hemoglobin C trait  - Hemoglobin C trait is a hemoglobinopathy that is clinically silent. Patient educated bout HbC trait. No expected significant clinical pathology from this hemoglobin variant.   -We will check LDH and haptoglobin on follow-up to evaluate for any small amount of hemolysis.   - Total bilirubin is slightly elevated at 1.5  # Transaminitis  -Unclear cause, patient advised to follow with her PCP for work-up of elevated liver enzymes    Follow up in 6 months for trending of polycythemia    Signed By: Shahnaz Eubanks MD      Attending Medical Oncologist   [facility]

## 2021-10-20 ENCOUNTER — TELEPHONE (OUTPATIENT)
Dept: SLEEP MEDICINE | Age: 61
End: 2021-10-20

## 2022-04-13 ENCOUNTER — TELEPHONE (OUTPATIENT)
Dept: ONCOLOGY | Age: 62
End: 2022-04-13

## 2022-04-13 NOTE — TELEPHONE ENCOUNTER
Called patient regarding labs and OV. Patient has confirmed 5/5/22 @ 130pm ([address]). Patient would like a reminder call regarding labs and appt.

## 2022-05-03 LAB
BASOPHILS # BLD AUTO: 0.1 X10E3/UL (ref 0–0.2)
BASOPHILS NFR BLD AUTO: 1 %
EOSINOPHIL # BLD AUTO: 0.4 X10E3/UL (ref 0–0.4)
EOSINOPHIL NFR BLD AUTO: 4 %
EPO SERPL-ACNC: 21.9 MIU/ML (ref 2.6–18.5)
ERYTHROCYTE [DISTWIDTH] IN BLOOD BY AUTOMATED COUNT: 13 % (ref 11.7–15.4)
HAPTOGLOB SERPL-MCNC: 110 MG/DL (ref 37–355)
HCT VFR BLD AUTO: 54.2 % (ref 34–46.6)
HGB BLD-MCNC: 17.4 G/DL (ref 11.1–15.9)
IMM GRANULOCYTES # BLD AUTO: 0 X10E3/UL (ref 0–0.1)
IMM GRANULOCYTES NFR BLD AUTO: 0 %
LDH SERPL-CCNC: 244 IU/L (ref 119–226)
LYMPHOCYTES # BLD AUTO: 3.9 X10E3/UL (ref 0.7–3.1)
LYMPHOCYTES NFR BLD AUTO: 40 %
MCH RBC QN AUTO: 31 PG (ref 26.6–33)
MCHC RBC AUTO-ENTMCNC: 32.1 G/DL (ref 31.5–35.7)
MCV RBC AUTO: 96 FL (ref 79–97)
MONOCYTES # BLD AUTO: 0.9 X10E3/UL (ref 0.1–0.9)
MONOCYTES NFR BLD AUTO: 9 %
NEUTROPHILS # BLD AUTO: 4.6 X10E3/UL (ref 1.4–7)
NEUTROPHILS NFR BLD AUTO: 46 %
PLATELET # BLD AUTO: 228 X10E3/UL (ref 150–450)
RBC # BLD AUTO: 5.62 X10E6/UL (ref 3.77–5.28)
WBC # BLD AUTO: 9.9 X10E3/UL (ref 3.4–10.8)

## 2022-05-05 ENCOUNTER — OFFICE VISIT (OUTPATIENT)
Dept: ONCOLOGY | Age: 62
End: 2022-05-05
Payer: COMMERCIAL

## 2022-05-05 VITALS
TEMPERATURE: 97.7 F | BODY MASS INDEX: 44.36 KG/M2 | DIASTOLIC BLOOD PRESSURE: 78 MMHG | SYSTOLIC BLOOD PRESSURE: 161 MMHG | HEART RATE: 70 BPM | HEIGHT: 66 IN | OXYGEN SATURATION: 94 % | WEIGHT: 276 LBS

## 2022-05-05 DIAGNOSIS — D75.1 ERYTHROCYTOSIS: Primary | ICD-10-CM

## 2022-05-05 PROCEDURE — 99214 OFFICE O/P EST MOD 30 MIN: CPT | Performed by: STUDENT IN AN ORGANIZED HEALTH CARE EDUCATION/TRAINING PROGRAM

## 2022-05-05 RX ORDER — IBUPROFEN 200 MG
1 TABLET ORAL EVERY 24 HOURS
Qty: 30 PATCH | Refills: 0 | Status: SHIPPED | OUTPATIENT
Start: 2022-05-05 | End: 2022-06-04

## 2022-05-05 NOTE — PROGRESS NOTES
Telma Stark is a 59 y.o. female here for follow up for RBC abnormality, Erythrocytosis. Pt here to discuss recent labs. Pt states she has been doing well. No concerns brought up. 1. Have you been to the ER, urgent care clinic since your last visit? Hospitalized since your last visit? no    2. Have you seen or consulted any other health care providers outside of the [address] [place] [address] since your last visit? Include any pap smears or colon screening.  PCP

## 2022-12-13 ENCOUNTER — VIRTUAL VISIT (OUTPATIENT)
Dept: ONCOLOGY | Age: 62
End: 2022-12-13
Payer: COMMERCIAL

## 2022-12-13 DIAGNOSIS — D75.1 POLYCYTHEMIA: Primary | ICD-10-CM

## 2022-12-13 PROCEDURE — 99213 OFFICE O/P EST LOW 20 MIN: CPT | Performed by: STUDENT IN AN ORGANIZED HEALTH CARE EDUCATION/TRAINING PROGRAM

## 2022-12-13 RX ORDER — LATANOPROST 50 UG/ML
SOLUTION/ DROPS OPHTHALMIC
COMMUNITY
Start: 2022-09-11

## 2022-12-13 RX ORDER — DULOXETIN HYDROCHLORIDE 60 MG/1
CAPSULE, DELAYED RELEASE ORAL
COMMUNITY
Start: 2022-10-07

## 2022-12-13 NOTE — PROGRESS NOTES
Paulo Fraga, who was evaluated through a synchronous (real-time) audio-video encounter, and/or her healthcare decision maker, is aware that it is a billable service, which includes applicable co-pays, with coverage as determined by her insurance carrier. She provided verbal consent to proceed and patient identification was verified. This visit was conducted pursuant to the emergency declaration under the [facility] and the [employer] General Act. A caregiver was present when appropriate. Ability to conduct physical exam was limited. The patient was located at: Home: [address] [place] [address]  The provider was located at: [facility]  [address] [place] [place] [address]    --Shyann Rivas on 12/13/2022 at 10:48 AM          Paulo Fraga is a 57 y.o. female who presents for follow up of   Chief Complaint   Patient presents with    Follow-up     Rbc abnormality, erythocytosis       The patient reports no new clinical symptoms or new complaints since last clinic evaluation. No interval hospitalizations reported    No interval surgery or procedures reported    No reported new medication changes reported       Medications reviewed with the patient, and chart updated to reflect changes. Review of Systems   Constitutional: Negative. HENT: Negative. Eyes: Negative. Respiratory: Negative. Cardiovascular: Negative. Gastrointestinal: Negative. Genitourinary: Negative. Musculoskeletal: Negative. Skin: Negative. Neurological: Negative. Endo/Heme/Allergies: Negative.     Psychiatric/Behavioral: Negative.   '

## 2023-01-10 NOTE — PROGRESS NOTES
Cancer [place] at [address] [facility] [address]  W: [phone] F: [phone]      Reason for Visit:   Delicia Gallardo is a 57 y.o. female who is seen in consultation at the request of Lori Salter NP for evaluation of erythrocytosis, RBC abnormality . Hematology / Oncology Treatment History:     Hematological/Oncological Diagnosis: RBC abnormality, Erythrocytosis    History of Present Illness:       Very pleasant 56-year-old history was significant for diabetes mellitus type 2, hypertension, anxiety disorder, asthma, osteoarthritis, presents for evaluation and treatment of neuropathy as well as elevated RBC count. Patient reports that she has no significant clinical symptoms lately considering patient has been seen. She denies any unintentional weight loss, severe fatigue, night sweats, or any bone pains. She does note bilateral lower extremity neuropathy that has been bothering her significantly and is in a stocking glove pattern on her lower extremities. On further discussion, the patient reports that about 40 years ago, she was told that she had a hemoglobin variant that affected her blood. She was told that this did not have any associated clinical findings and she is unclear what the initial diagnosis was. No other known family history of blood disease or blood disorder. On review of social history, the patient is a current smoker smoking about 1.25 to 1.5 packs/day. She does note that this is recently increased over the last year as a consequence of anxiety. She has had no recent history of any colonoscopies and is overdue for her routine mammography.     Interval History:   Delicia Gallardo, who was evaluated through a synchronous (real-time) audio-video encounter, and/or her healthcare decision maker, is aware that it is a billable service, which includes applicable co-pays, with coverage as determined by her insurance carrier. She provided verbal consent to proceed and patient identification was verified. This visit was conducted pursuant to the emergency declaration under the [address] authority and the [employer] and [employer] General Act. A caregiver was present when appropriate. Ability to conduct physical exam was limited. The patient was located at: Home: [address] [place] [address]  The provider was located at: Facility (Appt Department): [facility] [address]  [address] [place] [id number]     --[facility] on 2022 at 10:48 AM    No new clinical complaints since last visit, continues to have lower extremity neuropathy, fatigue. No clear vasomotor symptoms. Patient overall feeling very well. Continues to smoke tobacco.  She had sleep study in  positive per patient. Review of Systems: A complete review of systems was obtained, negative except as described above. Past Medical History:   Diagnosis Date    Diabetes ([address]     Hypertension       Past Surgical History:   Procedure Laterality Date    HX GYN          HX HEENT      craniotomy and another procedure    HX ORTHOPAEDIC Bilateral 2011    carpal tunnel release surgery      Social History     Tobacco Use    Smoking status: Every Day     Packs/day: 1.25     Years: 40.00     Pack years: 50.00     Types: Cigarettes    Smokeless tobacco: Never   Substance Use Topics    Alcohol use: Yes     Comment: VERY RARE/ HOLDAYS ONLY      No family history on file. Current Outpatient Medications   Medication Sig    DULoxetine (CYMBALTA) 60 mg capsule     latanoprost (XALATAN) 0.005 % ophthalmic solution     pregabalin (LYRICA) 75 mg capsule Take 75 mg by mouth two (2) times a day.  Indications: diabetic complication causing injury to some body nerves    albuterol (PROVENTIL HFA, VENTOLIN HFA, PROAIR HFA) 90 mcg/actuation inhaler INHALE TWO PUFFS BY MOUTH EVERY 4 TO 6 HOURS AS NEEDED    amLODIPine (NORVASC) 5 mg tablet TAKE ONE TABLET BY MOUTH EVERY DAY    aspirin delayed-release 81 mg tablet TAKE ONE TABLET BY MOUTH EVERY DAY    atorvastatin (LIPITOR) 20 mg tablet TAKE ONE TABLET BY MOUTH EVERY DAY    OneTouch Verio test strips strip USE TO CHECK BLOOD GLUCOSE TWO TIMES A DAY AND AS NEEDED    celecoxib (CELEBREX) 200 mg capsule TAKE ONE CAPSULE BY MOUTH TWICE A DAY    cetirizine (ZYRTEC) 10 mg tablet TAKE ONE TABLET BY MOUTH EVERY DAY    Jardiance 25 mg tablet TAKE ONE TABLET BY MOUTH EVERY DAY IN THE MORNING    hydroCHLOROthiazide (HYDRODIURIL) 12.5 mg tablet TAKE ONE TABLET BY MOUTH EVERY DAY    OneTouch Delica Plus Lancet 30 gauge misc USE TO CHECK BLOOD GLUCOSE TWO TIMES A DAY AND AS NEEDED    OneTouch Delica Plus Lancet 33 gauge misc USE TO CHECK BLOOD GLUCOSE TWO TIMES A DAY AND AS NEEDED    NovoLIN 70/30 U-100 Insulin 100 unit/mL (70-30) injection INJECT 63 UNITS UNDER THE SKIN TWO TIMES A DAY    metFORMIN (GLUCOPHAGE) 1,000 mg tablet TAKE ONE TABLET BY MOUTH TWICE A DAY    metoprolol tartrate (LOPRESSOR) 100 mg IR tablet TAKE ONE TABLET BY MOUTH TWICE A DAY    Insulin Syringe-Needle U-100 1 mL 31 gauge x 5/16 syrg USE TWO TIMES A DAY AS DIRECTED    gabapentin (NEURONTIN) 400 mg capsule Take 400 mg by mouth three (3) times daily. (Patient not taking: No sig reported)     No current facility-administered medications for this visit. No Known Allergies         Physical Exam:     There were no vitals taken for this visit.     ECOG PS: 1  General: alert, cooperative, no distress   Mental  status: normal mood, behavior, speech, dress, motor activity, and thought processes, able to follow commands   HENT: NCAT   Neck: no visualized mass   Resp: no respiratory distress   Neuro: no gross deficits   Skin: no discoloration or lesions of concern on visible areas   Psychiatric: normal affect, consistent with stated mood, no evidence of hallucinations Results:     Lab Results   Component Value Date/Time    WBC 12.5 (H) 12/12/2022 01:55 PM    HGB 17.0 (H) 12/12/2022 01:55 PM    HCT 49.8 (H) 12/12/2022 01:55 PM    PLATELET 506 62/15/7447 01:55 PM    MCV 92.4 12/12/2022 01:55 PM    ABS. NEUTROPHILS 5.5 12/12/2022 01:55 PM     Lab Results   Component Value Date/Time    Sodium 143 12/12/2022 01:55 PM    Potassium 3.9 12/12/2022 01:55 PM    Chloride 111 (H) 12/12/2022 01:55 PM    CO2 27 12/12/2022 01:55 PM    Glucose 94 12/12/2022 01:55 PM    BUN 17 12/12/2022 01:55 PM    Creatinine 1.16 (H) 12/12/2022 01:55 PM    GFR est AA 58 (L) 06/17/2021 02:30 PM    GFR est non-AA 48 (L) 06/17/2021 02:30 PM    Calcium 9.6 12/12/2022 01:55 PM    Glucose (POC) 341 (H) 04/17/2009 02:28 PM     Lab Results   Component Value Date/Time    Bilirubin, total 1.6 (H) 12/12/2022 01:55 PM    ALT (SGPT) 42 12/12/2022 01:55 PM    Alk. phosphatase 109 12/12/2022 01:55 PM    Protein, total 7.2 12/12/2022 01:55 PM    Albumin 3.8 12/12/2022 01:55 PM    Globulin 3.4 12/12/2022 01:55 PM         Assessment and Recommendations:     # Polycythemia with elevated EPO level, suspect secondary polycythemia.   - Etiology of polycythemia may also be secondary to-continued smoking. The patient is smoking 1.5 ppd and continues. Lab Results   Component Value Date/Time    WBC 12.5 (H) 12/12/2022 01:55 PM    HGB 17.0 (H) 12/12/2022 01:55 PM    HCT 49.8 (H) 12/12/2022 01:55 PM    PLATELET 635 07/43/6990 01:55 PM    MCV 92.4 12/12/2022 01:55 PM       -JAK2 negative, patient continues to have symptoms of slight fatigue.  -Polycythemia appears to be stable. No clinical worsening of symptoms. No constitutional symptoms reported. # Hemoglobin C trait  - Hemoglobin C trait is a hemoglobinopathy that is clinically silent. Patient educated bout HbC trait.   No expected significant clinical pathology from this hemoglobin variant.   - LDH slightly elevated, likely from subclinical hemolysis    Follow up in 6 months for trending of polycythemia    Signed By: Sindy Machado MD      Attending [id number] [facility]

## 2023-04-22 DIAGNOSIS — D75.1 POLYCYTHEMIA: Primary | ICD-10-CM

## 2023-04-23 DIAGNOSIS — D75.1 POLYCYTHEMIA: Primary | ICD-10-CM

## 2023-04-24 DIAGNOSIS — D75.1 POLYCYTHEMIA: Primary | ICD-10-CM

## 2023-06-26 ENCOUNTER — TELEPHONE (OUTPATIENT)
Age: 63
End: 2023-06-26

## 2023-06-26 DIAGNOSIS — D75.1 POLYCYTHEMIA: ICD-10-CM

## 2023-06-27 LAB
ALBUMIN SERPL-MCNC: 3.8 G/DL (ref 3.5–5)
ALBUMIN/GLOB SERPL: 1.1 (ref 1.1–2.2)
ALP SERPL-CCNC: 94 U/L (ref 45–117)
ALT SERPL-CCNC: 39 U/L (ref 12–78)
ANION GAP SERPL CALC-SCNC: 5 MMOL/L (ref 5–15)
AST SERPL-CCNC: 27 U/L (ref 15–37)
BASOPHILS # BLD: 0.1 K/UL (ref 0–0.1)
BASOPHILS NFR BLD: 1 % (ref 0–1)
BILIRUB SERPL-MCNC: 1.6 MG/DL (ref 0.2–1)
BUN SERPL-MCNC: 11 MG/DL (ref 6–20)
BUN/CREAT SERPL: 10 (ref 12–20)
CALCIUM SERPL-MCNC: 9.3 MG/DL (ref 8.5–10.1)
CHLORIDE SERPL-SCNC: 107 MMOL/L (ref 97–108)
CO2 SERPL-SCNC: 27 MMOL/L (ref 21–32)
CREAT SERPL-MCNC: 1.06 MG/DL (ref 0.55–1.02)
DIFFERENTIAL METHOD BLD: ABNORMAL
EOSINOPHIL # BLD: 0.4 K/UL (ref 0–0.4)
EOSINOPHIL NFR BLD: 5 % (ref 0–7)
ERYTHROCYTE [DISTWIDTH] IN BLOOD BY AUTOMATED COUNT: 12.7 % (ref 11.5–14.5)
GLOBULIN SER CALC-MCNC: 3.5 G/DL (ref 2–4)
GLUCOSE SERPL-MCNC: 192 MG/DL (ref 65–100)
HCT VFR BLD AUTO: 51 % (ref 35–47)
HGB BLD-MCNC: 17.3 G/DL (ref 11.5–16)
IMM GRANULOCYTES # BLD AUTO: 0 K/UL (ref 0–0.04)
IMM GRANULOCYTES NFR BLD AUTO: 1 % (ref 0–0.5)
LYMPHOCYTES # BLD: 2.6 K/UL (ref 0.8–3.5)
LYMPHOCYTES NFR BLD: 31 % (ref 12–49)
MCH RBC QN AUTO: 31 PG (ref 26–34)
MCHC RBC AUTO-ENTMCNC: 33.9 G/DL (ref 30–36.5)
MCV RBC AUTO: 91.4 FL (ref 80–99)
MONOCYTES # BLD: 0.7 K/UL (ref 0–1)
MONOCYTES NFR BLD: 8 % (ref 5–13)
NEUTS SEG # BLD: 4.7 K/UL (ref 1.8–8)
NEUTS SEG NFR BLD: 54 % (ref 32–75)
NRBC # BLD: 0 K/UL (ref 0–0.01)
NRBC BLD-RTO: 0 PER 100 WBC
PERIPHERAL SMEAR, MD REVIEW: NORMAL
PLATELET # BLD AUTO: 216 K/UL (ref 150–400)
PMV BLD AUTO: 12 FL (ref 8.9–12.9)
POTASSIUM SERPL-SCNC: 3.8 MMOL/L (ref 3.5–5.1)
PROT SERPL-MCNC: 7.3 G/DL (ref 6.4–8.2)
RBC # BLD AUTO: 5.58 M/UL (ref 3.8–5.2)
SODIUM SERPL-SCNC: 139 MMOL/L (ref 136–145)
WBC # BLD AUTO: 8.4 K/UL (ref 3.6–11)

## 2023-06-28 ENCOUNTER — OFFICE VISIT (OUTPATIENT)
Age: 63
End: 2023-06-28
Payer: COMMERCIAL

## 2023-06-28 VITALS
SYSTOLIC BLOOD PRESSURE: 145 MMHG | HEART RATE: 72 BPM | OXYGEN SATURATION: 95 % | RESPIRATION RATE: 17 BRPM | TEMPERATURE: 98.6 F | BODY MASS INDEX: 45.48 KG/M2 | DIASTOLIC BLOOD PRESSURE: 81 MMHG | WEIGHT: 273 LBS | HEIGHT: 65 IN

## 2023-06-28 DIAGNOSIS — D75.1 SECONDARY POLYCYTHEMIA: Primary | ICD-10-CM

## 2023-06-28 PROCEDURE — 99214 OFFICE O/P EST MOD 30 MIN: CPT | Performed by: STUDENT IN AN ORGANIZED HEALTH CARE EDUCATION/TRAINING PROGRAM

## 2024-04-23 ENCOUNTER — TRANSCRIBE ORDERS (OUTPATIENT)
Facility: HOSPITAL | Age: 64
End: 2024-04-23

## 2024-04-23 DIAGNOSIS — Z12.2 SCREENING FOR MALIGNANT NEOPLASM OF RESPIRATORY ORGAN: Primary | ICD-10-CM

## 2024-05-08 ENCOUNTER — HOSPITAL ENCOUNTER (OUTPATIENT)
Facility: HOSPITAL | Age: 64
Discharge: HOME | End: 2024-05-11
Payer: COMMERCIAL

## 2024-05-08 ENCOUNTER — APPOINTMENT (OUTPATIENT)
Facility: HOSPITAL | Age: 64
End: 2024-05-08
Payer: COMMERCIAL

## 2024-05-08 DIAGNOSIS — Z12.31 ENCOUNTER FOR SCREENING MAMMOGRAM FOR MALIGNANT NEOPLASM OF BREAST: ICD-10-CM

## 2024-05-08 PROCEDURE — 77067 SCR MAMMO BI INCL CAD: CPT

## 2025-06-16 ENCOUNTER — TRANSCRIBE ORDERS (OUTPATIENT)
Facility: HOSPITAL | Age: 65
End: 2025-06-16

## 2025-06-16 ENCOUNTER — TELEPHONE (OUTPATIENT)
Age: 65
End: 2025-06-16

## 2025-06-16 DIAGNOSIS — D75.1 SECONDARY POLYCYTHEMIA: Primary | ICD-10-CM

## 2025-06-16 DIAGNOSIS — Z12.31 OTHER SCREENING MAMMOGRAM: Primary | ICD-10-CM

## 2025-06-16 NOTE — TELEPHONE ENCOUNTER
Pt is requesting an appt. To be seen. Appears pt did not come to her 1 yr follow up 6/2024. Please advise when pt should be seen and if anything is needed prior to being seen.

## 2025-06-30 ENCOUNTER — HOSPITAL ENCOUNTER (OUTPATIENT)
Facility: HOSPITAL | Age: 65
Discharge: HOME | End: 2025-07-03

## 2025-06-30 DIAGNOSIS — D75.1 SECONDARY POLYCYTHEMIA: ICD-10-CM

## 2025-07-01 LAB
ALBUMIN SERPL-MCNC: 3.9 G/DL (ref 3.5–5)
ALBUMIN/GLOB SERPL: 1.1 (ref 1.1–2.2)
ALP SERPL-CCNC: 99 U/L (ref 45–117)
ALT SERPL-CCNC: 35 U/L (ref 12–78)
ANION GAP SERPL CALC-SCNC: 9 MMOL/L (ref 2–12)
AST SERPL-CCNC: 22 U/L (ref 15–37)
BASOPHILS # BLD: 0.04 K/UL (ref 0–0.1)
BASOPHILS NFR BLD: 0.5 % (ref 0–1)
BILIRUB SERPL-MCNC: 1.9 MG/DL (ref 0.2–1)
BUN SERPL-MCNC: 15 MG/DL (ref 6–20)
BUN/CREAT SERPL: 11 (ref 12–20)
CALCIUM SERPL-MCNC: 10.2 MG/DL (ref 8.5–10.1)
CHLORIDE SERPL-SCNC: 107 MMOL/L (ref 97–108)
CO2 SERPL-SCNC: 23 MMOL/L (ref 21–32)
CREAT SERPL-MCNC: 1.33 MG/DL (ref 0.55–1.02)
DIFFERENTIAL METHOD BLD: ABNORMAL
EOSINOPHIL # BLD: 0.29 K/UL (ref 0–0.4)
EOSINOPHIL NFR BLD: 3.3 % (ref 0–7)
ERYTHROCYTE [DISTWIDTH] IN BLOOD BY AUTOMATED COUNT: 12.9 % (ref 11.5–14.5)
GLOBULIN SER CALC-MCNC: 3.4 G/DL (ref 2–4)
GLUCOSE SERPL-MCNC: 157 MG/DL (ref 65–100)
HCT VFR BLD AUTO: 47.3 % (ref 35–47)
HGB BLD-MCNC: 16.8 G/DL (ref 11.5–16)
IMM GRANULOCYTES # BLD AUTO: 0.03 K/UL (ref 0–0.04)
IMM GRANULOCYTES NFR BLD AUTO: 0.3 % (ref 0–0.5)
LYMPHOCYTES # BLD: 3.08 K/UL (ref 0.8–3.5)
LYMPHOCYTES NFR BLD: 35.5 % (ref 12–49)
MCH RBC QN AUTO: 30.7 PG (ref 26–34)
MCHC RBC AUTO-ENTMCNC: 35.5 G/DL (ref 30–36.5)
MCV RBC AUTO: 86.3 FL (ref 80–99)
MONOCYTES # BLD: 0.75 K/UL (ref 0–1)
MONOCYTES NFR BLD: 8.6 % (ref 5–13)
NEUTS SEG # BLD: 4.49 K/UL (ref 1.8–8)
NEUTS SEG NFR BLD: 51.8 % (ref 32–75)
NRBC # BLD: 0 K/UL (ref 0–0.01)
NRBC BLD-RTO: 0 PER 100 WBC
PLATELET # BLD AUTO: 238 K/UL (ref 150–400)
PMV BLD AUTO: 11.8 FL (ref 8.9–12.9)
POTASSIUM SERPL-SCNC: 3.7 MMOL/L (ref 3.5–5.1)
PROT SERPL-MCNC: 7.3 G/DL (ref 6.4–8.2)
RBC # BLD AUTO: 5.48 M/UL (ref 3.8–5.2)
SODIUM SERPL-SCNC: 139 MMOL/L (ref 136–145)
WBC # BLD AUTO: 8.7 K/UL (ref 3.6–11)

## 2025-07-02 ENCOUNTER — OFFICE VISIT (OUTPATIENT)
Age: 65
End: 2025-07-02
Payer: COMMERCIAL

## 2025-07-02 VITALS
WEIGHT: 269 LBS | HEART RATE: 69 BPM | SYSTOLIC BLOOD PRESSURE: 133 MMHG | TEMPERATURE: 98.1 F | HEIGHT: 66 IN | BODY MASS INDEX: 43.23 KG/M2 | OXYGEN SATURATION: 98 % | DIASTOLIC BLOOD PRESSURE: 70 MMHG | RESPIRATION RATE: 17 BRPM

## 2025-07-02 DIAGNOSIS — D75.1 SECONDARY POLYCYTHEMIA: Primary | ICD-10-CM

## 2025-07-02 PROCEDURE — 99213 OFFICE O/P EST LOW 20 MIN: CPT | Performed by: STUDENT IN AN ORGANIZED HEALTH CARE EDUCATION/TRAINING PROGRAM

## 2025-07-02 PROCEDURE — 1123F ACP DISCUSS/DSCN MKR DOCD: CPT | Performed by: STUDENT IN AN ORGANIZED HEALTH CARE EDUCATION/TRAINING PROGRAM

## 2025-07-02 NOTE — PROGRESS NOTES
[facility] [place] at [facility]   [address] [facility] [address] [address]   W: [phone] F: [phone]             Reason for Visit:     Marianela Winter is a 62 y.o.  female who is seen in consultation at the request of Yesy Kaye NP for evaluation of erythrocytosis, RBC abnormality .       Hematology / Oncology Treatment History:        Hematological/Oncological Diagnosis: RBC abnormality, Erythrocytosis       History of Present Illness:           Very pleasant 60-year-old history was significant for diabetes mellitus type 2, hypertension, anxiety disorder, asthma, osteoarthritis, presents for evaluation and treatment of neuropathy as well as elevated RBC count.      Patient reports that she has no significant clinical symptoms lately considering patient has been seen.  She denies any unintentional weight loss, severe fatigue, night sweats, or any bone pains.  She does note bilateral lower extremity neuropathy that  has been bothering her significantly and is in a stocking glove pattern on her lower extremities.      On further discussion, the patient reports that about 40 years ago, she was told that she had a hemoglobin variant that affected her blood.  She was told that this did not have any associated clinical findings and she is unclear what the initial diagnosis  was.  No other known family history of blood disease or blood disorder.      On review of social history, the patient is a current smoker smoking about 1.25 to 1.5 packs/day.  She does note that this is recently increased over the last year as a consequence of anxiety.  She has had no recent history of any colonoscopies and is  overdue for her routine mammography.      Interval History:      7/2/25        Doing well. No symptoms. Overall in good spirits.  Still smoking        Physical Exam:       Vitals:    07/02/25 1416   BP: 133/70   Pulse: 69   Resp: 17   Temp: 98.1 °F (36.7

## 2025-07-02 NOTE — PROGRESS NOTES
Marianela Winter is a 64 y.o. female who presents for follow up of   Chief Complaint   Patient presents with    Follow-up      polycythemia       The patient reports no new clinical symptoms or new complaints since last clinic evaluation. She reports  some dry skin but overall is doing well.       No interval hospitalizations reported    No interval surgery or procedures reported    No reported new medication changes reported       Medications reviewed with the patient, and chart updated to reflect changes.